# Patient Record
Sex: MALE | Race: BLACK OR AFRICAN AMERICAN | Employment: FULL TIME | URBAN - METROPOLITAN AREA
[De-identification: names, ages, dates, MRNs, and addresses within clinical notes are randomized per-mention and may not be internally consistent; named-entity substitution may affect disease eponyms.]

---

## 2017-10-17 ENCOUNTER — HOSPITAL ENCOUNTER (OUTPATIENT)
Dept: LAB | Age: 37
Discharge: HOME | End: 2017-10-17

## 2017-10-17 PROCEDURE — 99001 SPECIMEN HANDLING PT-LAB: CPT | Performed by: NURSE PRACTITIONER

## 2018-08-16 ENCOUNTER — OFFICE VISIT (OUTPATIENT)
Dept: INTERNAL MEDICINE CLINIC | Age: 38
End: 2018-08-16

## 2018-08-16 VITALS
RESPIRATION RATE: 16 BRPM | HEART RATE: 96 BPM | TEMPERATURE: 98.8 F | DIASTOLIC BLOOD PRESSURE: 80 MMHG | OXYGEN SATURATION: 97 % | WEIGHT: 234.2 LBS | HEIGHT: 66 IN | BODY MASS INDEX: 37.64 KG/M2 | SYSTOLIC BLOOD PRESSURE: 122 MMHG

## 2018-08-16 DIAGNOSIS — I10 BENIGN HYPERTENSION WITHOUT CHF: ICD-10-CM

## 2018-08-16 DIAGNOSIS — F31.9 BIPOLAR AFFECTIVE DISORDER, REMISSION STATUS UNSPECIFIED (HCC): Primary | ICD-10-CM

## 2018-08-16 DIAGNOSIS — G47.00 INSOMNIA, UNSPECIFIED TYPE: ICD-10-CM

## 2018-08-16 RX ORDER — DIVALPROEX SODIUM 500 MG/1
1000 TABLET, EXTENDED RELEASE ORAL
COMMUNITY
End: 2019-04-10

## 2018-08-16 RX ORDER — DIPHENHYDRAMINE HCL 25 MG
25 TABLET ORAL
COMMUNITY
End: 2019-08-14

## 2018-08-16 RX ORDER — BISMUTH SUBSALICYLATE 262 MG
1 TABLET,CHEWABLE ORAL DAILY
COMMUNITY
End: 2019-04-10

## 2018-08-16 RX ORDER — QUETIAPINE FUMARATE 400 MG/1
400 TABLET, FILM COATED ORAL
COMMUNITY
End: 2019-08-14

## 2018-08-16 RX ORDER — HYDROCHLOROTHIAZIDE 25 MG/1
25 TABLET ORAL
COMMUNITY
End: 2018-08-16 | Stop reason: SDUPTHER

## 2018-08-16 RX ORDER — HYDROCHLOROTHIAZIDE 25 MG/1
25 TABLET ORAL
Qty: 90 TAB | Refills: 3 | Status: SHIPPED | OUTPATIENT
Start: 2018-08-16 | End: 2018-11-14 | Stop reason: SDUPTHER

## 2018-08-16 NOTE — MR AVS SNAPSHOT
[facility] 
 
 
 [address] [facility] [address] 
[phone] Patient: Costa Arce MRN: [id number] [id number] Visit Information Date & Time Provider Department Dept. Phone Encounter #  
 8/16/2018  3:30 PM Bonny Marin MD [facility] [phone] [id number] Follow-up Instructions Return in about 3 months (around 11/16/2018) for f/u HTN. Upcoming Health Maintenance Date Due DTaP/Tdap/Td series (1 - Tdap) 11/15/2003 Influenza Age 5 to Adult 9/1/2018* *Topic was postponed. The date shown is not the original due date. Allergies as of 8/16/2018  Review Complete On: 8/16/2018 By: Madhu Shen MD  
  
 Severity Noted Reaction Type Reactions Ambien [Zolpidem]  08/16/2018    Other (comments) Sleep walking Melatonin  08/16/2018    Other (comments) Weird dreams Current Immunizations  Never Reviewed No immunizations on file. Not reviewed this visit You Were Diagnosed With   
  
 Codes Comments Benign hypertension without CHF    -  Primary ICD-10-CM: I10 
ICD-9-CM: 401.1 Bipolar affective disorder, remission status unspecified ([facility]     ICD-10-CM: F31.9 ICD-9-CM: 296.80 Vitals BP Pulse Temp Resp Height(growth percentile) Weight(growth percentile) 122/80 (BP 1 Location: Left arm, BP Patient Position: Sitting) 96 98.8 °F (37.1 °C) (Oral) 16 5' 6\" (1.676 m) 234 lb 3.2 oz (106.2 kg) SpO2 BMI Smoking Status 97% 37.8 kg/m2 Never Smoker Vitals History BMI and BSA Data Body Mass Index Body Surface Area  
 37.8 kg/m 2 2.22 m 2 Preferred Pharmacy Pharmacy Name Phone [facility] [address] [place] [address] [phone] Your Updated Medication List  
  
   
This list is accurate as of 8/16/18  4:06 PM.  Always use your most recent med list.  
  
  
  
 BENADRYL ALLERGY 25 mg tablet Generic drug:  diphenhydrAMINE Take 25 mg by mouth every six (6) hours as needed for Sleep. Indications: 25-50mg every other night DEPAKOTE  mg ER tablet Generic drug:  divalproex ER Take 1,000 mg by mouth nightly. HAIR,SKIN AND NAILS PO Take  by mouth. hydroCHLOROthiazide 25 mg tablet Commonly known as:  HYDRODIURIL Take 1 Tab by mouth nightly. multivitamin tablet Commonly known as:  ONE A DAY Take 1 Tab by mouth daily. OTHER Neuro sleep drink at bedtime SEROquel 400 mg tablet Generic drug:  QUEtiapine Take 400 mg by mouth nightly. Prescriptions Sent to Pharmacy Refills  
 hydroCHLOROthiazide (HYDRODIURIL) 25 mg tablet 3 Sig: Take 1 Tab by mouth nightly. Class: Normal  
 Pharmacy: [facility] [address] [place] [address] #: [phone] Route: Oral  
  
Follow-up Instructions Return in about 3 months (around 11/16/2018) for f/u HTN. Patient Instructions 1) Follow-up in 3 months or sooner if worsening symptoms. 2) talk to your psychiatrist about other medications for your sleep. Introducing [facility]! [employer] introduces [employer] patient portal. Now you can access parts of your medical record, email your doctor's office, and request medication refills online. 1. In your internet browser, go to [email or web address] 2. Click on the First Time User? Click Here link in the Sign In box. You will see the New Member Sign Up page. 3. Enter your [employer]yl Access Code exactly as it appears below. You will not need to use this code after youve completed the sign-up process. If you do not sign up before the expiration date, you must request a new code. · [employer] Access Code: [email or web address] Expires: 11/14/2018  4:06 PM 
 
 4. Enter the last four digits of your Social Security Number (xxxx) and Date of Birth (mm/dd/yyyy) as indicated and click Submit. You will be taken to the next sign-up page. 5. Create a Play With Pictures / HangPic ID. This will be your Play With Pictures / HangPic login ID and cannot be changed, so think of one that is secure and easy to remember. 6. Create a [employer] With Pictures / HangPic password. You can change your password at any time. 7. Enter your Password Reset Question and Answer. This can be used at a later time if you forget your password. 8. Enter your e-mail address. You will receive e-mail notification when new information is available in [address] 9. Click Sign Up. You can now view and download portions of your medical record. 10. Click the Download Summary menu link to download a portable copy of your medical information. If you have questions, please visit the Frequently Asked Questions section of the [employer] With Pictures / HangPic website. Remember, [employer] HangPic is NOT to be used for urgent needs. For medical emergencies, dial 911. Now available from your iPhone and Android! Please provide this summary of care documentation to your next provider. Your primary care clinician is listed as NONE. If you have any questions after today's visit, please call [phone].

## 2018-08-16 NOTE — PATIENT INSTRUCTIONS
1) Follow-up in 3 months or sooner if worsening symptoms. 2) talk to your psychiatrist about other medications for your sleep.

## 2018-08-16 NOTE — PROGRESS NOTES
ROOM # 3    Ceci Rivas presents today for   Chief Complaint   Patient presents with    Establish Care    Depression     f/u    Medication Refill       Ceci Rivas preferred language for health care discussion is english/other. Is someone accompanying this pt? no    Is the patient using any DME equipment during OV? no    Depression Screening:  PHQ over the last two weeks 8/16/2018 8/16/2018   PHQ Not Done Active Diagnosis of Depression or Bipolar Disorder -   Little interest or pleasure in doing things Not at all Not at all   Feeling down, depressed, irritable, or hopeless Not at all Not at all   Total Score PHQ 2 0 0       Learning Assessment:  Learning Assessment 8/16/2018   PRIMARY LEARNER Patient   HIGHEST LEVEL OF EDUCATION - PRIMARY LEARNER  4 YEARS OF COLLEGE   BARRIERS PRIMARY LEARNER NONE   CO-LEARNER CAREGIVER No   PRIMARY LANGUAGE ENGLISH   LEARNER PREFERENCE PRIMARY DEMONSTRATION     PICTURES     VIDEOS     READING   ANSWERED BY patient   RELATIONSHIP SELF       Abuse Screening:  Abuse Screening Questionnaire 8/16/2018   Do you ever feel afraid of your partner? N   Are you in a relationship with someone who physically or mentally threatens you? N   Is it safe for you to go home? Y       Fall Risk  No flowsheet data found. Health Maintenance reviewed and discussed per provider. Yes    Ceci Rivas is due for   Health Maintenance Due   Topic Date Due    DTaP/Tdap/Td series (1 - Tdap) 11/15/2003     Please order/place referral if appropriate. Advance Directive:  1. Do you have an advance directive in place? Patient Reply: no    2. If not, would you like material regarding how to put one in place? Patient Reply: yes, given    Coordination of Care:  1. Have you been to the ER, urgent care clinic since your last visit? Hospitalized since your last visit? [facility]    2.  Have you seen or consulted any other health care providers outside of the [facility] [address] since your last visit? Include any pap smears or colon screening.  no

## 2018-08-16 NOTE — PROGRESS NOTES
Chief Complaint   Patient presents with    [facility]    Depression     f/u    Medication Refill         HPI:     Evangelina Mclaughlin is a 30 y.o.  male with history of  Bipolar disorder and hypertension here for above complaint. Sandra Suresh He was admitted at [facility] from 7/22/18-7/31/18 for suicidal ideations. Psychiatrist appt. in 1 month, Dr. Osborne Jeremiah at [address] [place] [address] psychiatric services and therapist next week. He denies any chest pain, shortness of breath, abdominal pain, headaches or dizziness. They discharged him on seroquel and depakote, however I don't have any other records accept patients after visit summary. We will get the records to evaluate more. He said that the did get lab work twice. He currently is not suicidal or having homicidal ideations. Insomnia: he said that he needs something for sleep. The seroquel is not putting him to sleep. He is taking the benadryl in addition to the seroquel for sleep. He was on trazadone in the past for sleep. He drinks a sleep aid drink for 2 years without any problems. He also wants vistaril. Past Medical History:   Diagnosis Date    Acid reflux     Anxiety     occ. panic attacks    Bipolar disorder ([facility] 08/2018    c schizoeffective tendencies/depression    Calculus of kidney 2014    x3 at once    Hypertension     Psychotic disorder        Past Surgical History:   Procedure Laterality Date    HX OTHER SURGICAL  2014    kindey stones broken up, then stent placement (later removed)           MEDICATION ALLERGIES/INTOLERANCES:   Allergies   Allergen Reactions    Ambien [Zolpidem] Other (comments)     Sleep walking    Melatonin Other (comments)     Weird dreams             CURRENT MEDICATIONS:    Current Outpatient Prescriptions   Medication Sig    divalproex ER (DEPAKOTE ER) 500 mg ER tablet Take 1,000 mg by mouth nightly.  QUEtiapine (SEROQUEL) 400 mg tablet Take 400 mg by mouth nightly.     diphenhydrAMINE (BENADRYL ALLERGY) 25 mg tablet Take 25 mg by mouth every six (6) hours as needed for Sleep. Indications: 25-50mg every other night    multivitamin (ONE A DAY) tablet Take 1 Tab by mouth daily.  multivitamin with minerals (HAIR,SKIN AND NAILS PO) Take  by mouth.  OTHER Neuro sleep drink at bedtime    hydroCHLOROthiazide (HYDRODIURIL) 25 mg tablet Take 1 Tab by mouth nightly. No current facility-administered medications for this visit. Health Maintenance   Topic Date Due    DTaP/Tdap/Td series (1 - Tdap) 11/15/2003    Influenza Age 5 to Adult  09/01/2018 (Originally 8/1/2018)         FAMILY HISTORY:   Family History   Problem Relation Age of Onset    Depression Mother     Hypertension Father     Elevated Lipids Father     Diabetes Father     No Known Problems Sister     No Known Problems Brother     No Known Problems Brother        SOCIAL HISTORY:   He  reports that he has never smoked.  He has never used smokeless tobacco.  He  reports that he drinks about 3.6 oz of alcohol per week           ROS:   General: negative for - chills, fatigue, fever, weight loss or weight gain, night sweats  HEENT: negative for - no sore throat, nasal congestion, vision problems or ear problems  Resp: negative for - cough, shortness of breath or wheezing  CV: negative for - chest pain, palpitations, orthopnea or PND,  GI: negative for - abdominal pain, change in bowel habits, constipation, diarrhea, blood or black tarry stools, or nausea/vomiting  : negative for - dysuria, hematuria, increase urgency and frequency, nocturia  Heme: negative for -excessive bleeding or bruising  Endo: negative for - polydipsia/polyuria or hot or cold intolerance  MSK: negative for - joint pain, joint swelling or muscle pain  Neuro: negative for - numbness, tingling, headache or dizziness  Derm: negative for - dry skin, hair changes, rash or skin lesion changes  Psych: negative for - anxiety, , irritability or mood swings, positive for insomnia and depression    OBJECTIVE:  PHYSICAL EXAM: Vitals:   Vitals:    08/16/18 1527 08/16/18 1605   BP: 132/90 122/80   Pulse: 96    Resp: 16    Temp: 98.8 °F (37.1 °C)    TempSrc: Oral    SpO2: 97%    Weight: 234 lb 3.2 oz (106.2 kg)    Height: 5' 6\" (1.676 m)      Exam:   Generally: Pleasant male in no acute distress    Cardiac exam: regular, rate, and rhythm. No murmurs, gallops, or rubs. Normal S1 and S2.    Pulmonary exam: Clear to ausculation bilaterally    Abdominal exam: Positive bowel sounds in all four quadrants. Soft. Nondistended. Nontender. No hepatosplenomegaly. Extremities: 2+ dorsalis pedis bilaterally. No pedal edema bilaterally. LABS/RADIOLOGICAL TESTS:   Lab Results   Component Value Date/Time    WBC 3.7 (L) 11/07/2015 06:45 AM    HGB 15.3 11/07/2015 06:45 AM    HCT 44.2 11/07/2015 06:45 AM    PLATELET 838 54/25/2299 06:45 AM     Lab Results   Component Value Date/Time    Sodium 141 11/06/2015 11:01 AM    Potassium 3.3 (L) 11/06/2015 11:01 AM    Chloride 105 11/06/2015 11:01 AM    CO2 26 11/06/2015 11:01 AM    Glucose 93 11/06/2015 11:01 AM    BUN 22 (H) 11/06/2015 11:01 AM    Creatinine 1.15 11/06/2015 11:01 AM     No results found for: CHOL, CHOLX, CHLST, CHOLV, HDL, LDL, LDLC, DLDLP, TGLX, TRIGL, TRIGP  No results found for: GPT  Previous labs          ASSESSMENT/PLAN:      ICD-10-CM ICD-9-CM    1. Bipolar affective disorder, remission status unspecified [facility] F31.9 296.80 Stable for now. Continue the depakote and seroquel. Follow up with psychiatry. He needs to talk to them about getting the vistaril and trazadone. 2. Benign hypertension without CHF I10 401.1 Stable. Continue the HCTZ, diet and exercise. hydroCHLOROthiazide (HYDRODIURIL) 25 mg tablet   3. Insomnia, unspecified type G47.00 780.52 Discuss with psychiatry regarding this. 4. We will get records from [place] and if he needs to get labs, we will contact him.      5.   Requested Prescriptions Signed Prescriptions Disp Refills    hydroCHLOROthiazide (HYDRODIURIL) 25 mg tablet 90 Tab 3     Sig: Take 1 Tab by mouth nightly. 6. Patient verbalized understanding and agreement with the plan. 7. Patient was given an after visit summary. 8. Addendum: had TDAP in the past 10 years. Follow-up Disposition:  Return in about 3 months (around 11/16/2018) for f/u HTN. or sooner if worsening symptoms.           Reese Cristina MD

## 2018-11-14 ENCOUNTER — OFFICE VISIT (OUTPATIENT)
Dept: INTERNAL MEDICINE CLINIC | Age: 38
End: 2018-11-14

## 2018-11-14 VITALS
DIASTOLIC BLOOD PRESSURE: 83 MMHG | OXYGEN SATURATION: 98 % | HEART RATE: 81 BPM | HEIGHT: 66 IN | RESPIRATION RATE: 16 BRPM | SYSTOLIC BLOOD PRESSURE: 124 MMHG | BODY MASS INDEX: 36.96 KG/M2 | WEIGHT: 230 LBS | TEMPERATURE: 96.9 F

## 2018-11-14 DIAGNOSIS — I10 BENIGN HYPERTENSION WITHOUT CHF: ICD-10-CM

## 2018-11-14 DIAGNOSIS — I10 BENIGN HYPERTENSION WITHOUT CHF: Primary | ICD-10-CM

## 2018-11-14 DIAGNOSIS — Z13.1 DIABETES MELLITUS SCREENING: ICD-10-CM

## 2018-11-14 DIAGNOSIS — Z79.899 ON VALPROIC ACID THERAPY: ICD-10-CM

## 2018-11-14 DIAGNOSIS — Z13.21 ENCOUNTER FOR VITAMIN DEFICIENCY SCREENING: ICD-10-CM

## 2018-11-14 DIAGNOSIS — Z13.29 THYROID DISORDER SCREENING: ICD-10-CM

## 2018-11-14 DIAGNOSIS — Z23 ENCOUNTER FOR IMMUNIZATION: ICD-10-CM

## 2018-11-14 RX ORDER — TRAZODONE HYDROCHLORIDE 150 MG/1
TABLET ORAL
Refills: 1 | COMMUNITY
Start: 2018-11-13 | End: 2019-08-14

## 2018-11-14 RX ORDER — HYDROCHLOROTHIAZIDE 25 MG/1
25 TABLET ORAL
Qty: 90 TAB | Refills: 3 | Status: SHIPPED | OUTPATIENT
Start: 2018-11-14 | End: 2019-08-14

## 2018-11-14 RX ORDER — HYDROXYZINE 50 MG/1
TABLET, FILM COATED ORAL
Refills: 1 | COMMUNITY
Start: 2018-11-13 | End: 2019-04-10

## 2018-11-14 NOTE — PROGRESS NOTES
ROOM #  [address] presents today for   Chief Complaint   Patient presents with    Hypertension     3 month f/u    Medication Refill       Raciel Burgos preferred language for health care discussion is english/other. Is someone accompanying this pt? no    Is the patient using any DME equipment during OV? no    Depression Screening:  PHQ over the last two weeks 8/16/2018 8/16/2018   PHQ Not Done Active Diagnosis of Depression or Bipolar Disorder -   Little interest or pleasure in doing things Not at all Not at all   Feeling down, depressed, irritable, or hopeless Not at all Not at all   Total Score PHQ 2 0 0       Learning Assessment:  Learning Assessment 8/16/2018   PRIMARY LEARNER Patient   HIGHEST LEVEL OF EDUCATION - PRIMARY LEARNER  4 YEARS OF COLLEGE   BARRIERS PRIMARY LEARNER NONE   CO-LEARNER CAREGIVER No   PRIMARY LANGUAGE ENGLISH   LEARNER PREFERENCE PRIMARY DEMONSTRATION     PICTURES     VIDEOS     READING   ANSWERED BY patient   RELATIONSHIP SELF       Abuse Screening:  Abuse Screening Questionnaire 8/16/2018   Do you ever feel afraid of your partner? N   Are you in a relationship with someone who physically or mentally threatens you? N   Is it safe for you to go home? Y       Fall Risk  No flowsheet data found. Health Maintenance reviewed and discussed per provider. Yes    Raciel Burgos is due for   Health Maintenance Due   Topic Date Due    Influenza Age 5 to Adult  08/01/2018         Please order/place referral if appropriate. Advance Directive:  1. Do you have an advance directive in place? Patient Reply: no    2. If not, would you like material regarding how to put one in place? Patient Reply: no    Coordination of Care:  1. Have you been to the ER, urgent care clinic since your last visit? Hospitalized since your last visit? no    2. Have you seen or consulted any other health care providers outside of the [address] [place] [address] since your last visit? Include any pap smears or colon screening. no    Immunization History   Administered Date(s) Administered    Influenza Vaccine (Quad) PF 11/14/2018     Immunization administered by Reyna Sanchez LPN with pt's consent. Patient tolerated procedure well. Pt. Observed for 10 mins. No reactions noted/reported, VIS given.

## 2018-11-14 NOTE — PROGRESS NOTES
Chief Complaint   Patient presents with    Hypertension     3 month f/u    Medication Refill       HPI:     Roxi Ruiz is a 30 y.o.  male with history of  Hypertension and bipolar disorder here for the above complaint. He denies any chest pain, shortness of breath, abdominal pain, headaches or dizziness. He is seeing psychiatry monthly. Past Medical History:   Diagnosis Date    Acid reflux     Anxiety     occ. panic attacks    Bipolar disorder ([facility] 08/2018    c schizoeffective tendencies/depression    Calculus of kidney 2014    x3 at once    Hypertension     Psychotic disorder [facility])      Past Surgical History:   Procedure Laterality Date    HX OTHER SURGICAL  2014    kindey stones broken up, then stent placement (later removed)     Current Outpatient Medications   Medication Sig    hydrOXYzine HCl (ATARAX) 50 mg tablet TK 1 T PO  QID PRN    traZODone (DESYREL) 150 mg tablet TK 1 T PO  QHS    OTHER Testosterone booster: it is suppose to increase metabolism    hydroCHLOROthiazide (HYDRODIURIL) 25 mg tablet Take 1 Tab by mouth nightly.  divalproex ER (DEPAKOTE ER) 500 mg ER tablet Take 1,000 mg by mouth nightly.  QUEtiapine (SEROQUEL) 400 mg tablet Take 400 mg by mouth nightly.  diphenhydrAMINE (BENADRYL ALLERGY) 25 mg tablet Take 25 mg by mouth every six (6) hours as needed for Sleep. Indications: 25-50mg every other night    multivitamin (ONE A DAY) tablet Take 1 Tab by mouth daily.  OTHER Neuro sleep drink at bedtime prn     No current facility-administered medications for this visit. Health Maintenance   Topic Date Due    Influenza Age 5 to Adult  08/01/2018    DTaP/Tdap/Td series (1 - Tdap) 03/01/2019 (Originally 11/15/2003)     There is no immunization history for the selected administration types on file for this patient. No LMP for male patient. Allergies and Intolerances:    Allergies   Allergen Reactions    Ambien [Zolpidem] Other (comments)     Sleep walking    Melatonin Other (comments)     Weird dreams       Family History:   Family History   Problem Relation Age of Onset    Depression Mother     Hypertension Father     Elevated Lipids Father     Diabetes Father     No Known Problems Sister     No Known Problems Brother     No Known Problems Brother        Social History:   He  reports that  has never smoked. he has never used smokeless tobacco.  He  reports that he drinks about 3.6 oz of alcohol per week. ·     Objective:   Physical exam:   Visit Vitals  /83 (BP 1 Location: Right arm, BP Patient Position: Sitting)   Pulse 81   Temp 96.9 °F (36.1 °C) (Oral)   Resp 16   Ht 5' 6\" (1.676 m)   Wt 230 lb (104.3 kg)   SpO2 98%   BMI 37.12 kg/m²        Generally: Pleasant male in no acute distress  Cardiac Exam: regular, rate, and rhythm. Normal S1 and S2. No murmurs, gallops, or rubs  Pulmonary exam: Clear to auscultation bilaterally  Abdominal exam: Positive bowel sounds in all four quadrants, soft, nondistended, nontender  Extremities: 2+ dorsalis pedis pulses bilaterally. No pedal edema    bilaterally    LABS/Radiological Tests:  Lab Results   Component Value Date/Time    WBC 3.7 (L) 11/07/2015 06:45 AM    HGB 15.3 11/07/2015 06:45 AM    HCT 44.2 11/07/2015 06:45 AM    PLATELET 861 24/40/2957 06:45 AM     Lab Results   Component Value Date/Time    Sodium 141 11/06/2015 11:01 AM    Potassium 3.3 (L) 11/06/2015 11:01 AM    Chloride 105 11/06/2015 11:01 AM    CO2 26 11/06/2015 11:01 AM    Glucose 93 11/06/2015 11:01 AM    BUN 22 (H) 11/06/2015 11:01 AM    Creatinine 1.15 11/06/2015 11:01 AM     No results found for: CHOL, CHOLX, CHLST, CHOLV, HDL, LDL, LDLC, DLDLP, TGLX, TRIGL, TRIGP  No results found for: GPT    Previous labs      ASSESSMENT/PLAN:    1. Benign hypertension without CHF: stable. Continue the HCTZ, diet and exercise. -     METABOLIC PANEL, COMPREHENSIVE; Future  -     LIPID PANEL;  Future  -     CBC W/O DIFF; Future  -     URINALYSIS W/ RFLX MICROSCOPIC; Future  -     hydroCHLOROthiazide (HYDRODIURIL) 25 mg tablet; Take 1 Tab by mouth nightly. 2. On valproic acid therapy: Pt on depakote for his bipolar disorder. -     VALPROIC ACID; Future    3. Thyroid disorder screening  -     TSH 3RD GENERATION; Future    4. Diabetes mellitus screening  -     HEMOGLOBIN A1C WITH EAG; Future    5. Encounter for vitamin deficiency screening  -     VITAMIN D, 25 HYDROXY; Future    6. Encounter for immunization  -     INFLUENZA VIRUS VAC QUAD,SPLIT,PRESV FREE SYRINGE IM      7. Requested Prescriptions     Signed Prescriptions Disp Refills    hydroCHLOROthiazide (HYDRODIURIL) 25 mg tablet 90 Tab 3     Sig: Take 1 Tab by mouth nightly. 8. Patient verbalized understanding and agreement with the plan. 9. Patient was given an after-visit summary. 10. Follow-up Disposition:  Return in about 4 months (around 3/14/2019) for f/u HTN. or sooner if worsening symptoms.                 Mary Connelly MD

## 2018-12-16 LAB
25(OH)D3 SERPL-MCNC: 24.3 NG/ML (ref 32–100)
A-G RATIO,AGRAT: 2 RATIO (ref 1.1–2.6)
ALBUMIN SERPL-MCNC: 4.6 G/DL (ref 3.5–5)
ALP SERPL-CCNC: 48 U/L (ref 25–115)
ALT SERPL-CCNC: 24 U/L (ref 5–40)
ANION GAP SERPL CALC-SCNC: 16 MMOL/L
AST SERPL W P-5'-P-CCNC: 21 U/L (ref 10–37)
AVG GLU, 10930: 112 MG/DL (ref 91–123)
BILIRUB SERPL-MCNC: 0.4 MG/DL (ref 0.2–1.2)
BILIRUB UR QL: NEGATIVE
BUN SERPL-MCNC: 15 MG/DL (ref 6–22)
CALCIUM SERPL-MCNC: 9.5 MG/DL (ref 8.4–10.4)
CHLORIDE SERPL-SCNC: 102 MMOL/L (ref 98–110)
CHOLEST SERPL-MCNC: 184 MG/DL (ref 110–200)
CO2 SERPL-SCNC: 27 MMOL/L (ref 20–32)
CREAT SERPL-MCNC: 1.3 MG/DL (ref 0.5–1.2)
ERYTHROCYTE [DISTWIDTH] IN BLOOD BY AUTOMATED COUNT: 15.1 % (ref 10–15.5)
GFRAA, 66117: >60
GFRNA, 66118: >60
GLOBULIN,GLOB: 2.3 G/DL (ref 2–4)
GLUCOSE SERPL-MCNC: 91 MG/DL (ref 70–99)
GLUCOSE UR QL: NEGATIVE MG/DL
HBA1C MFR BLD HPLC: 5.5 % (ref 4.8–5.9)
HCT VFR BLD AUTO: 53 % (ref 36.6–51.9)
HDLC SERPL-MCNC: 4.4 MG/DL (ref 0–5)
HDLC SERPL-MCNC: 42 MG/DL (ref 40–59)
HGB BLD-MCNC: 16.4 G/DL (ref 13.2–17.3)
HGB UR QL STRIP: NEGATIVE
KETONES UR QL STRIP.AUTO: NEGATIVE MG/DL
LDLC SERPL CALC-MCNC: 113 MG/DL (ref 50–99)
LEUKOCYTE ESTERASE: NEGATIVE
MCH RBC QN AUTO: 30 PG (ref 26–34)
MCHC RBC AUTO-ENTMCNC: 31 G/DL (ref 31–36)
MCV RBC AUTO: 98 FL (ref 80–95)
NITRITE UR QL STRIP.AUTO: NEGATIVE
PH UR STRIP: 6 PH (ref 5–8)
PLATELET # BLD AUTO: 162 K/UL (ref 140–440)
PMV BLD AUTO: 10.1 FL (ref 9–13)
POTASSIUM SERPL-SCNC: 4.1 MMOL/L (ref 3.5–5.5)
PROT SERPL-MCNC: 6.9 G/DL (ref 6.4–8.3)
PROT UR QL STRIP: NEGATIVE MG/DL
RBC # BLD AUTO: 5.41 M/UL (ref 3.8–5.8)
RBC #/AREA URNS HPF: NORMAL /HPF
SODIUM SERPL-SCNC: 145 MMOL/L (ref 133–145)
SP GR UR: 1.02 (ref 1–1.03)
TRIGL SERPL-MCNC: 144 MG/DL (ref 40–149)
TSH SERPL DL<=0.005 MIU/L-ACNC: 0.38 MCU/ML (ref 0.27–4.2)
UROBILINOGEN UR STRIP-MCNC: <2 MG/DL
VALPROATE SERPL-MCNC: 92 MCG/ML (ref 50–100)
VLDLC SERPL CALC-MCNC: 29 MG/DL (ref 8–30)
WBC # BLD AUTO: 3.7 K/UL (ref 4–11)
WBC URNS QL MICRO: NORMAL /HPF (ref 0–2)

## 2018-12-17 PROBLEM — E55.9 VITAMIN D DEFICIENCY: Status: ACTIVE | Noted: 2018-12-01

## 2019-04-10 ENCOUNTER — OFFICE VISIT (OUTPATIENT)
Dept: INTERNAL MEDICINE CLINIC | Age: 39
End: 2019-04-10

## 2019-04-10 VITALS
HEART RATE: 75 BPM | BODY MASS INDEX: 34.55 KG/M2 | SYSTOLIC BLOOD PRESSURE: 136 MMHG | OXYGEN SATURATION: 98 % | HEIGHT: 66 IN | TEMPERATURE: 97.4 F | WEIGHT: 215 LBS | DIASTOLIC BLOOD PRESSURE: 82 MMHG | RESPIRATION RATE: 18 BRPM

## 2019-04-10 DIAGNOSIS — I10 BENIGN HYPERTENSION WITHOUT CHF: Primary | ICD-10-CM
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
## 2019-04-10 DIAGNOSIS — S40.862A INSECT BITE OF LEFT UPPER EXTREMITY, INITIAL ENCOUNTER: ICD-10-CM

## 2019-04-10 RX ORDER — DOXYCYCLINE 100 MG/1
100 TABLET ORAL 2 TIMES DAILY
Qty: 14 TAB | Refills: 0 | Status: SHIPPED | OUTPATIENT
Start: 2019-04-10 | End: 2019-04-17

## 2019-04-10 NOTE — PATIENT INSTRUCTIONS
1) Stop the bactrim. 2) Drink lots of water to clear the antibiotic out your system. Take benadryl as needed. 3) Follow-up in 4 months or sooner if worsening symptoms. Drug Allergy: Care Instructions  Your Care Instructions    A drug allergy occurs when your immune system overreacts to something in a medicine. This causes an allergic reaction. You may have skin problems, such as hives or a rash. Your lips, mouth, and throat may swell. You may have trouble breathing. And you may have belly pain, nausea, vomiting, or diarrhea. A reaction can range from mild to life-threatening. After you have an allergic reaction to a medicine, you may always be allergic to that medicine and to others like it. Drug allergies are different than side effects and drug interactions. Side effects are bad reactions to a medicine. Drug interactions occur when two or more medicines that you take do not get along in your body. Some people may confuse these with drug allergies. Follow-up care is a key part of your treatment and safety. Be sure to make and go to all appointments, and call your doctor if you are having problems. It's also a good idea to know your test results and keep a list of the medicines you take. How can you care for yourself at home? · Your doctor may prescribe a shot of epinephrine to carry with you in case you have a severe reaction. Learn how to give yourself the shot, and keep it with you at all times. Make sure it has not . · Go to the emergency room every time you have a severe reaction. Go even if you have used your shot of epinephrine and are feeling better. Symptoms can come back after a shot. · Be safe with medicines. If you were given a medicine for your allergic reaction, take it exactly as directed. Call your doctor if you think you are having a problem with your medicine. · Avoid medicines like the one that caused your allergy.  Ask your doctor or pharmacist if you think you may be taking a similar medicine. · If you have a mild skin rash or itching from your allergy:  ? Wear light clothing that does not irritate your skin. ? Use calamine lotion. Or take an over-the-counter antihistamine, such as diphenhydramine (Benadryl) or loratadine (Claritin). Read and follow the instructions on the label. ? Take a cool shower or bath. ? Do not use strong soaps, detergents, and other chemicals. They can make itching worse. · Wear medical alert jewelry that lists your allergies. You can buy this at most [employer]. · Be sure that anyone treating you for any health problem knows that you are allergic to this medicine. When should you call for help? Give an epinephrine shot if:    · You think you are having a severe allergic reaction.    After giving an epinephrine shot call 911, even if you feel better.   Call 911 if:    · You have symptoms of a severe allergic reaction. These may include:  ? Sudden raised, red areas (hives) all over your body. ? Swelling of the throat, mouth, lips, or tongue. ? Trouble breathing. ? Passing out (losing consciousness). Or you may feel very lightheaded or suddenly feel weak, confused, or restless.     · You have been given an epinephrine shot, even if you feel better.    Call your doctor now or seek immediate medical care if:    · You have symptoms of an allergic reaction, such as:  ? A rash or hives (raised, red areas on the skin). ? Itching. ? Swelling. ? Belly pain, nausea, or vomiting.    Watch closely for changes in your health, and be sure to contact your doctor if:    · You do not get better as expected. Where can you learn more? Go to http://michelle-ilana.info/. Enter J731 in the search box to learn more about \"Drug Allergy: Care Instructions. \"  Current as of: June 27, 2018  Content Version: 11.9  © 4673-5184 [employer].  Care instructions adapted under license by [employer] (which disclaims liability or warranty for this information). If you have questions about a medical condition or this instruction, always ask your healthcare professional. [address] any warranty or liability for your use of this information. Drug Allergy: Care Instructions  Your Care Instructions    A drug allergy occurs when your immune system overreacts to something in a medicine. This causes an allergic reaction. You may have skin problems, such as hives or a rash. Your lips, mouth, and throat may swell. You may have trouble breathing. And you may have belly pain, nausea, vomiting, or diarrhea. A reaction can range from mild to life-threatening. After you have an allergic reaction to a medicine, you may always be allergic to that medicine and to others like it. Drug allergies are different than side effects and drug interactions. Side effects are bad reactions to a medicine. Drug interactions occur when two or more medicines that you take do not get along in your body. Some people may confuse these with drug allergies. Follow-up care is a key part of your treatment and safety. Be sure to make and go to all appointments, and call your doctor if you are having problems. It's also a good idea to know your test results and keep a list of the medicines you take. How can you care for yourself at home? · Your doctor may prescribe a shot of epinephrine to carry with you in case you have a severe reaction. Learn how to give yourself the shot, and keep it with you at all times. Make sure it has not . · Go to the emergency room every time you have a severe reaction. Go even if you have used your shot of epinephrine and are feeling better. Symptoms can come back after a shot. · Be safe with medicines. If you were given a medicine for your allergic reaction, take it exactly as directed. Call your doctor if you think you are having a problem with your medicine.   · Avoid medicines like the one that caused your allergy. Ask your doctor or pharmacist if you think you may be taking a similar medicine. · If you have a mild skin rash or itching from your allergy:  ? Wear light clothing that does not irritate your skin. ? Use calamine lotion. Or take an over-the-counter antihistamine, such as diphenhydramine (Benadryl) or loratadine (Claritin). Read and follow the instructions on the label. ? Take a cool shower or bath. ? Do not use strong soaps, detergents, and other chemicals. They can make itching worse. · Wear medical alert jewelry that lists your allergies. You can buy this at most [employer]. · Be sure that anyone treating you for any health problem knows that you are allergic to this medicine. When should you call for help? Give an epinephrine shot if:    · You think you are having a severe allergic reaction.    After giving an epinephrine shot call 911, even if you feel better.   Call 911 if:    · You have symptoms of a severe allergic reaction. These may include:  ? Sudden raised, red areas (hives) all over your body. ? Swelling of the throat, mouth, lips, or tongue. ? Trouble breathing. ? Passing out (losing consciousness). Or you may feel very lightheaded or suddenly feel weak, confused, or restless.     · You have been given an epinephrine shot, even if you feel better.    Call your doctor now or seek immediate medical care if:    · You have symptoms of an allergic reaction, such as:  ? A rash or hives (raised, red areas on the skin). ? Itching. ? Swelling. ? Belly pain, nausea, or vomiting.    Watch closely for changes in your health, and be sure to contact your doctor if:    · You do not get better as expected. Where can you learn more? Go to http://michelle-ilana.info/. Enter J512 in the search box to learn more about \"Drug Allergy: Care Instructions. \"  Current as of: June 27, 2018  Content Version: 11.9  © 8347-2904 [employer].  Care instructions adapted under license by [address] (which disclaims liability or warranty for this information). If you have questions about a medical condition or this instruction, always ask your healthcare professional. [address] any warranty or liability for your use of this information.

## 2019-04-10 NOTE — PROGRESS NOTES
ROOM # 2    Julieth Laura presents today for   Chief Complaint   Patient presents with    Hypertension    Allergic Reaction       Julieth Laura preferred language for health care discussion is english/other. Is someone accompanying this pt? no    Is the patient using any DME equipment during [address]? no    Depression Screening:  3 most recent PHQ Screens 8/16/2018 8/16/2018   PHQ Not Done Active Diagnosis of Depression or Bipolar Disorder -   Little interest or pleasure in doing things Not at all Not at all   Feeling down, depressed, irritable, or hopeless Not at all Not at all   Total Score PHQ 2 0 0       Learning Assessment:  Learning Assessment 8/16/2018   PRIMARY LEARNER Patient   HIGHEST LEVEL OF EDUCATION - PRIMARY LEARNER  4 YEARS OF COLLEGE   BARRIERS PRIMARY LEARNER NONE   CO-LEARNER CAREGIVER No   PRIMARY LANGUAGE ENGLISH   LEARNER PREFERENCE PRIMARY DEMONSTRATION     PICTURES     VIDEOS     READING   ANSWERED BY patient   RELATIONSHIP SELF       Abuse Screening:  Abuse Screening Questionnaire 8/16/2018   Do you ever feel afraid of your partner? N   Are you in a relationship with someone who physically or mentally threatens you? N   Is it safe for you to go home? Y       Fall Risk  No flowsheet data found. Health Maintenance reviewed and discussed per provider. Yes    Julieth Laura is due for   Health Maintenance Due   Topic Date Due    DTaP/Tdap/Td series (1 - Tdap) 11/15/2003     Please order/place referral if appropriate. Advance Directive:  1. Do you have an advance directive in place? Patient Reply: no    2. If not, would you like material regarding how to put one in place? Patient Reply: no    Coordination of Care:  1. Have you been to the ER, urgent care clinic since your last visit? Hospitalized since your last visit? yes    2. Have you seen or consulted any other health care providers outside of the [address] [place] [address] since your last visit?  Include any pap smears or colon screening.  no

## 2019-04-10 NOTE — PROGRESS NOTES
Chief Complaint   Patient presents with    Hypertension    Allergic Reaction       HPI:     Kristy Man is a 41 y.o.  male with history of anxiety and hypertension  here for the above complaint. He denies any chest pain, shortness of breath, abdominal pain, headaches or dizziness. He broke out to hives. He was bitten by an insect and was put on bactrim. This was started on Monday. He has been taking benadryl and steroid cream. He took 1 bactrim this AM.           Past Medical History:   Diagnosis Date    Acid reflux     Anxiety     occ. panic attacks    [facility] [address] 08/2018    c schizoeffective tendencies/depression    Calculus of kidney 2014    x3 at once    Hypertension     Psychotic disorder ([facility] [address]     Vitamin D deficiency 12/2018     Past Surgical History:   Procedure Laterality Date    HX OTHER SURGICAL  2014    kindey stones broken up, then stent placement (later removed)     Current Outpatient Medications   Medication Sig    doxycycline (ADOXA) 100 mg tablet Take 1 Tab by mouth two (2) times a day for 7 days.  traZODone (DESYREL) 150 mg tablet TK 1 T PO  QHS    hydroCHLOROthiazide (HYDRODIURIL) 25 mg tablet Take 1 Tab by mouth nightly.  QUEtiapine (SEROQUEL) 400 mg tablet Take 400 mg by mouth nightly.  diphenhydrAMINE (BENADRYL ALLERGY) 25 mg tablet Take 25 mg by mouth every six (6) hours as needed for Sleep. Indications: 25-50mg every other night     No current facility-administered medications for this visit. Health Maintenance   Topic Date Due    DTaP/Tdap/Td series (1 - Tdap) 11/15/2003    Influenza Age 5 to Adult  08/01/2019    Pneumococcal 0-64 years  Aged [employer] History   Administered Date(s) Administered    Influenza Vaccine (Quad) PF 11/14/2018     No LMP for male patient. Allergies and Intolerances:    Allergies   Allergen Reactions    Ambien [Zolpidem] Other (comments)     Sleep walking    Bactrim [Sulfamethoxazole-Trimethoprim] Hives    Melatonin Other (comments)     Weird dreams       Family History:   Family History   Problem Relation Age of Onset    Depression Mother     Hypertension Father     Elevated Lipids Father     Diabetes Father     No Known Problems Sister     No Known Problems Brother     No Known Problems Brother        Social History:   He  reports that he has never smoked. He has never used smokeless tobacco.  He  reports that he drinks about 3.6 oz of alcohol per week. ·     Objective:   Physical exam:   Visit Vitals  /82 (BP 1 Location: Right arm, BP Patient Position: Sitting)   Pulse 75   Temp 97.4 °F (36.3 °C) (Oral)   Resp 18   Ht 5' 6\" (1.676 m)   Wt 215 lb (97.5 kg)   SpO2 98%   BMI 34.70 kg/m²        Generally: Pleasant male in no acute distress  Cardiac Exam: regular, rate, and rhythm. Normal S1 and S2. No murmurs, gallops, or rubs  Pulmonary exam: Clear to auscultation bilaterally  Abdominal exam: Positive bowel sounds in all four quadrants, soft, nondistended, nontender  Extremities: 2+ dorsalis pedis pulses bilaterally. No pedal edema    bilaterally  On both arms are hives and area on back of forearm is area of insect bite. No rash or hives on back and chest. Only on arms.      LABS/Radiological Tests:  Lab Results   Component Value Date/Time    WBC 3.7 (L) 12/16/2018 05:21 PM    HGB 16.4 12/16/2018 05:21 PM    HCT 53.0 (H) 12/16/2018 05:21 PM    PLATELET 740 05/83/0179 05:21 PM     Lab Results   Component Value Date/Time    Sodium 145 12/16/2018 05:21 PM    Potassium 4.1 12/16/2018 05:21 PM    Chloride 102 12/16/2018 05:21 PM    CO2 27 12/16/2018 05:21 PM    Glucose 91 12/16/2018 05:21 PM    BUN 15 12/16/2018 05:21 PM    Creatinine 1.3 (H) 12/16/2018 05:21 PM     Lab Results   Component Value Date/Time    Cholesterol, total 184 12/16/2018 05:21 PM    HDL Cholesterol 42 12/16/2018 05:21 PM    LDL, calculated 113 (H) 12/16/2018 05:21 PM    Triglyceride 144 12/16/2018 05:21 PM     No results found for: GPT    Previous labs    ASSESSMENT/PLAN:    1. Benign hypertension without CHF: stable. Continue diet, exercise and HCTZ.   -     METABOLIC PANEL, BASIC; Future    2. Insect bite of left upper extremity, initial encounter: d/c the bactrim and will switch to doxycycline 100mg one po bid x 7 days. Put bactrim on allergy list. He was told to continue the steroid cream benadryl.  -     doxycycline (ADOXA) 100 mg tablet; Take 1 Tab by mouth two (2) times a day for 7 days. 3.   Requested Prescriptions     Signed Prescriptions Disp Refills    doxycycline (ADOXA) 100 mg tablet 14 Tab 0     Sig: Take 1 Tab by mouth two (2) times a day for 7 days. 4. Patient verbalized understanding and agreement with the plan. 5. Patient was given an after-visit summary. 6.   Follow-up and Dispositions    · Return in about 4 months (around 8/10/2019) for f/u HTN  or sooner if worsening symptoms.                      Lalita Ventura MD

## 2019-04-11 LAB
ANION GAP SERPL CALC-SCNC: 16 MMOL/L
BUN SERPL-MCNC: 17 MG/DL (ref 6–22)
CALCIUM SERPL-MCNC: 9.8 MG/DL (ref 8.4–10.4)
CHLORIDE SERPL-SCNC: 100 MMOL/L (ref 98–110)
CO2 SERPL-SCNC: 28 MMOL/L (ref 20–32)
CREAT SERPL-MCNC: 1.2 MG/DL (ref 0.5–1.2)
GFRAA, 66117: >60
GFRNA, 66118: >60
GLUCOSE SERPL-MCNC: 92 MG/DL (ref 70–99)
POTASSIUM SERPL-SCNC: 3.8 MMOL/L (ref 3.5–5.5)
SODIUM SERPL-SCNC: 144 MMOL/L (ref 133–145)

## 2019-08-14 ENCOUNTER — OFFICE VISIT (OUTPATIENT)
Dept: INTERNAL MEDICINE CLINIC | Age: 39
End: 2019-08-14

## 2019-08-14 VITALS
BODY MASS INDEX: 33.27 KG/M2 | TEMPERATURE: 98.1 F | RESPIRATION RATE: 17 BRPM | SYSTOLIC BLOOD PRESSURE: 126 MMHG | WEIGHT: 207 LBS | HEIGHT: 66 IN | DIASTOLIC BLOOD PRESSURE: 85 MMHG | OXYGEN SATURATION: 94 % | HEART RATE: 84 BPM

## 2019-08-14 DIAGNOSIS — I10 BENIGN HYPERTENSION WITHOUT CHF: Primary | ICD-10-CM

## 2019-08-14 DIAGNOSIS — G47.00 INSOMNIA, UNSPECIFIED TYPE: ICD-10-CM

## 2019-08-14 DIAGNOSIS — Z02.83 ENCOUNTER FOR DRUG SCREENING: ICD-10-CM

## 2019-08-14 NOTE — PROGRESS NOTES
Chief Complaint   Patient presents with    Hypertension     4 month fu        HPI:     Terence Mayes is a 41 y.o.  male with history of bipolar disorder, hypertension and anxiety here for the above complaint. Insomnia: He is having problems with sleeping. He is taking 10mg melatonin and it did not help. He has appt. With psychiatry tomorrow. He does not drink caffeinated tea and does not drink soda late at night. He has tried sleepy time tea and he said it did not work. He denies any chest pain, shortness of breath, abdominal pain, headaches or dizziness. He is off all medications, including psychiatric medications and wants to try more natural measures. Past Medical History:   Diagnosis Date    Acid reflux     Anxiety     occ. panic attacks    Bipolar disorder ([facility] [address] 08/2018    c schizoeffective tendencies/depression    Calculus of kidney 2014    x3 at once    Hypertension     Psychotic disorder ([facility] [address]     Vitamin D deficiency 12/2018     Past Surgical History:   Procedure Laterality Date    HX OTHER SURGICAL  2014    kindey stones broken up, then stent placement (later removed)     No current outpatient medications on file. No current facility-administered medications for this visit. Health Maintenance   Topic Date Due    DTaP/Tdap/Td series (1 - Tdap) 11/15/2003    Influenza Age 5 to Adult  08/01/2019    Pneumococcal 0-64 years  Aged [employer] History   Administered Date(s) Administered    Influenza Vaccine (Quad) PF 11/14/2018     No LMP for male patient. Allergies and Intolerances:    Allergies   Allergen Reactions    Ambien [Zolpidem] Other (comments)     Sleep walking    Bactrim [Sulfamethoxazole-Trimethoprim] Hives    Melatonin Other (comments)     Weird dreams       Family History:   Family History   Problem Relation Age of Onset    Depression Mother     Hypertension Father     Elevated Lipids Father     Diabetes Father    24 \[facility]\"" No Known Problems Sister     No Known Problems Brother     No Known Problems Brother        Social History:   He  reports that he has never smoked. He has never used smokeless tobacco.  He  reports that he drinks about 6.0 standard drinks of alcohol per week. ROS:  · General: negative for - chills, fever, weight changes or malaise, night sweats  · HEENT: no sore throat, nasal congestion,  vision problems or ear problems  · Respiratory: no cough, shortness of breath, or wheezing  · Cardiovascular: no chest pain, palpitations, or dyspnea on exertion; no orthopnea or PND  · Gastrointestinal: no abdominal pain, N/V, change in bowel habits,  black or bloody stools, constipation or diarrhea  · Musculoskeletal: no back pain, joint pain, joint stiffness, muscle pain or muscle weakness  · Neurological: no numbness, tingling, headache or dizziness  · Psychological: negative for - anxiety, depression, sleep disturbances, suicidal or homicidal ideations    Objective:   Physical exam:   Visit Vitals  /85 (BP 1 Location: Left arm, BP Patient Position: Sitting)   Pulse 84   Temp 98.1 °F (36.7 °C) (Oral)   Resp 17   Ht 5' 6\" (1.676 m)   Wt 207 lb (93.9 kg)   SpO2 94%   BMI 33.41 kg/m²        Generally: Pleasant male in no acute distress  HEENT Exam: Head: atraumatic               Eyes: PERRLA    Ears: bilaterally normal TM, no erythema or exudate, normal light reflex    Nares: moist mucosa, no erythema    Mouth: Clear, no erythema or exudate    Neck: supple, no LAD  Cardiac Exam: regular, rate, and rhythm. Normal S1 and S2. No murmurs, gallops, or rubs  Pulmonary exam: Clear to auscultation bilaterally  Abdominal exam: Positive bowel sounds in all four quadrants, soft, nondistended, nontender  Extremities: 2+ dorsalis pedis pulses bilaterally.  No pedal edema    bilaterally    LABS/Radiological Tests:  Lab Results   Component Value Date/Time    WBC 3.7 (L) 12/16/2018 05:21 PM    HGB 16.4 12/16/2018 05:21 PM    HCT 53.0 (H) 12/16/2018 05:21 PM    PLATELET 884 08/52/1394 05:21 PM     Lab Results   Component Value Date/Time    Sodium 144 04/10/2019 04:10 PM    Potassium 3.8 04/10/2019 04:10 PM    Chloride 100 04/10/2019 04:10 PM    CO2 28 04/10/2019 04:10 PM    Glucose 92 04/10/2019 04:10 PM    BUN 17 04/10/2019 04:10 PM    Creatinine 1.2 04/10/2019 04:10 PM     Lab Results   Component Value Date/Time    Cholesterol, total 184 12/16/2018 05:21 PM    HDL Cholesterol 42 12/16/2018 05:21 PM    LDL, calculated 113 (H) 12/16/2018 05:21 PM    Triglyceride 144 12/16/2018 05:21 PM     No results found for: GPT    Previous labs      ASSESSMENT/PLAN:    1. Benign hypertension without CHF:stable off BP medication. Continue diet and exercise and monitoring blood pressure and let us know if too high or too low. He is off HCTZ. 2. Encounter for drug screening: he needs uds for his work. -     COMPLIANCE DRUG SCREEN/PRESCRIPTION MONITORING; Future    3. Insomnia, unspecified type: He will try warm milk, but he will talk to his psychiatrist tomorrow about this. 4. Patient verbalized understanding and agreement with the plan. 5. Patient was given an after-visit summary. 6.   Follow-up and Dispositions    · Return in about 6 months (around 2/14/2020) for f/u HTN  or sooner if worsening symptoms.                      Christine Hernandez MD

## 2019-08-14 NOTE — PATIENT INSTRUCTIONS
1) Follow-up in 6 months or sooner if worsening symptoms. Learning About Sleeping Well  What does sleeping well mean? Sleeping well means getting enough sleep. How much sleep is enough varies among people. The number of hours you sleep is not as important as how you feel when you wake up. If you do not feel refreshed, you probably need more sleep. Another sign of not getting enough sleep is feeling tired during the day. The average total nightly sleep time is 7½ to 8 hours. Healthy adults may need a little more or a little less than this. Why is getting enough sleep important? Getting enough quality sleep is a basic part of good health. When your sleep suffers, your mood and your thoughts can suffer too. You may find yourself feeling more grumpy or stressed. Not getting enough sleep also can lead to serious problems, including injury, accidents, anxiety, and depression. What might cause poor sleeping? Many things can cause sleep problems, including:  · Stress. Stress can be caused by fear about a single event, such as giving a speech. Or you may have ongoing stress, such as worry about work or school. · Depression, anxiety, and other mental or emotional conditions. · Changes in your sleep habits or surroundings. This includes changes that happen where you sleep, such as noise, light, or sleeping in a different bed. It also includes changes in your sleep pattern, such as having jet lag or working a late shift. · Health problems, such as pain, breathing problems, and restless legs syndrome. · Lack of regular exercise. How can you help yourself? Here are some tips that may help you sleep more soundly and wake up feeling more refreshed. Your sleeping area  · Use your bedroom only for sleeping and sex. A bit of light reading may help you fall asleep. But if it doesn't, do your reading elsewhere in the house. Don't watch TV in bed.   · Be sure your bed is big enough to stretch out comfortably, especially if you have a sleep partner. · Keep your bedroom quiet, dark, and cool. Use curtains, blinds, or a sleep mask to block out light. To block out noise, use earplugs, soothing music, or a \"white noise\" machine. Your evening and bedtime routine  · Create a relaxing bedtime routine. You might want to take a warm shower or bath, listen to soothing music, or drink a cup of noncaffeinated tea. · Go to bed at the same time every night. And get up at the same time every morning, even if you feel tired. What to avoid  · Limit caffeine (coffee, tea, caffeinated sodas) during the day, and don't have any for at least 4 to 6 hours before bedtime. · Don't drink alcohol before bedtime. Alcohol can cause you to wake up more often during the night. · Don't smoke or use tobacco, especially in the evening. Nicotine can keep you awake. · Don't take naps during the day, especially close to bedtime. · Don't lie in bed awake for too long. If you can't fall asleep, or if you wake up in the middle of the night and can't get back to sleep within 15 minutes or so, get out of bed and go to another room until you feel sleepy. · Don't take medicine right before bed that may keep you awake or make you feel hyper or energized. Your doctor can tell you if your medicine may do this and if you can take it earlier in the day. If you can't sleep  · Imagine yourself in a peaceful, pleasant scene. Focus on the details and feelings of being in a place that is relaxing. · Get up and do a quiet or boring activity until you feel sleepy. · Don't drink any liquids after 6 p.m. if you wake up often because you have to go to the bathroom. Where can you learn more? Go to http://michelle-ilana.info/. Enter E053 in the search box to learn more about \"Learning About Sleeping Well. \"  Current as of: September 11, 2018  Content Version: 12.1  © 5163-0721 [employer].  Care instructions adapted under license by [employer] (which disclaims liability or warranty for this information). If you have questions about a medical condition or this instruction, always ask your healthcare professional. [address] any warranty or liability for your use of this information.

## 2019-08-14 NOTE — PROGRESS NOTES
ROOM # 2  Identified pt with two pt identifiers(name and ). Reviewed record in preparation for visit and have obtained necessary documentation. Chief Complaint   Patient presents with    Hypertension     4 month fu       Irvin Gomez preferred language for health care discussion is english/other. Is the patient using any DME equipment during OV? Marisol Vasquez is due for:  Health Maintenance Due   Topic    DTaP/Tdap/Td series (1 - Tdap)    Influenza Age 5 to Adult      Health Maintenance reviewed and discussed per provider  Please order/place referral if appropriate. Advance Directive:  1. Do you have an advance directive in place? Patient Reply: NO    2. If not, would you like material regarding how to put one in place? NO    Coordination of Care:  1. Have you been to the ER, urgent care clinic since your last visit? Hospitalized since your last visit? NO    2. Have you seen or consulted any other health care providers outside of the [address] [place] [address] since your last visit? Include any pap smears or colon screening. NO    Patient is accompanied by self I have received verbal consent from Irvin Gomez to discuss any/all medical information while they are present in the room.     Learning Assessment:  Learning Assessment 2018   PRIMARY LEARNER Patient   HIGHEST LEVEL OF EDUCATION - PRIMARY LEARNER  4 YEARS OF COLLEGE   BARRIERS PRIMARY LEARNER NONE   CO-LEARNER CAREGIVER No   PRIMARY LANGUAGE ENGLISH   LEARNER PREFERENCE PRIMARY DEMONSTRATION     PICTURES     VIDEOS     READING   ANSWERED BY patient   RELATIONSHIP SELF     Depression Screening:  3 most recent PHQ Screens 2018   PHQ Not Done Active Diagnosis of Depression or Bipolar Disorder -   Little interest or pleasure in doing things Not at all Not at all   Feeling down, depressed, irritable, or hopeless Not at all Not at all   Total Score PHQ 2 0 0     Abuse Screening:  Abuse Screening Questionnaire 8/16/2018   Do you ever feel afraid of your partner? N   Are you in a relationship with someone who physically or mentally threatens you? N   Is it safe for you to go home?  Y     Fall Risk  n/i

## 2019-08-22 LAB
MISCELLANEOUS TEST,99000: NORMAL
SENT TO, 434: NORMAL
TEST NAME, 435: NORMAL